# Patient Record
Sex: FEMALE | Race: ASIAN | NOT HISPANIC OR LATINO | URBAN - METROPOLITAN AREA
[De-identification: names, ages, dates, MRNs, and addresses within clinical notes are randomized per-mention and may not be internally consistent; named-entity substitution may affect disease eponyms.]

---

## 2020-09-25 ENCOUNTER — EMERGENCY (EMERGENCY)
Facility: HOSPITAL | Age: 50
LOS: 1 days | Discharge: HOME | End: 2020-09-25
Attending: EMERGENCY MEDICINE | Admitting: EMERGENCY MEDICINE
Payer: MEDICAID

## 2020-09-25 VITALS
OXYGEN SATURATION: 100 % | SYSTOLIC BLOOD PRESSURE: 124 MMHG | RESPIRATION RATE: 16 BRPM | DIASTOLIC BLOOD PRESSURE: 75 MMHG | TEMPERATURE: 98 F | HEART RATE: 104 BPM

## 2020-09-25 PROCEDURE — 99283 EMERGENCY DEPT VISIT LOW MDM: CPT

## 2020-09-25 RX ORDER — DIPHENHYDRAMINE HCL 50 MG
1 CAPSULE ORAL
Qty: 5 | Refills: 0
Start: 2020-09-25 | End: 2020-09-29

## 2020-09-25 RX ORDER — DIPHENHYDRAMINE HCL 50 MG
50 CAPSULE ORAL ONCE
Refills: 0 | Status: COMPLETED | OUTPATIENT
Start: 2020-09-25 | End: 2020-09-25

## 2020-09-25 RX ADMIN — Medication 50 MILLIGRAM(S): at 14:19

## 2020-09-25 RX ADMIN — Medication 60 MILLIGRAM(S): at 14:19

## 2020-09-25 NOTE — ED PROVIDER NOTE - CLINICAL SUMMARY MEDICAL DECISION MAKING FREE TEXT BOX
50 female htn dm hld c/o rash 1 week  -probable hives/allergic reaction - unclear trigger  -benadryl, prednisone  -outpt allergy follow up

## 2020-09-25 NOTE — ED PROVIDER NOTE - NSFOLLOWUPINSTRUCTIONS_ED_ALL_ED_FT
REST, NO STRENUOUS ACTIVITY  CONTINUE TAKING ANY CURRENT MEDICATIONS  TAKE NEW MEDICATIONS AS DIRECTED  **FOLLOW UP WITH ALLERGIST AS SOON AS POSSIBLE**  REFERRAL LIST GIVEN  RETURN TO ER FOR WORSENING SYMPTOMS

## 2020-09-25 NOTE — ED ADULT TRIAGE NOTE - CHIEF COMPLAINT QUOTE
Pt c[facility] rash  and [facility] back.  Denies new meds.  Pt seen at [facility] on Tuesday prescribed  zyrtec, and PCP prescribed atarax via phone.  Denies throat swelling, difficulty swallowing

## 2020-09-25 NOTE — ED PROVIDER NOTE - OBJECTIVE STATEMENT
50 htn dm hld presents to ER c/o rash x 1 week. Pt. is primarily Polish speaking -  ID [id number] - history obtained from translation service and daughter - states that for the past week has been experiencing diffuse pruritic rash - went to urgent care 5 days ago and given zyrtec with minimal releif - called her doctor who presceibed atarax 2 days ago - no relief as well. Denies tongue throat swelling or itching. States rash was on face as well but now has resolved and is most pronounced on trunk. Pt. milli new prodcuts food medications. Dneies fever chills nausea vomti cp sob or recent travel.

## 2020-09-25 NOTE — ED PROVIDER NOTE - PATIENT PORTAL LINK FT
You can access the FollowMyHealth Patient Portal offered by [facility] by registering at the following website: [email or web address]. By joining [employer]’s FollowMyHealth portal, you will also be able to view your health information using other applications (apps) compatible with our system.

## 2020-09-25 NOTE — ED PROVIDER NOTE - ATTENDING CONTRIBUTION TO CARE
DR. PAGE, ATTENDING MD-  I performed a face to face bedside interview with the patient regarding history of present illness, review of symptoms and past medical history. I completed an independent physical exam.  I have discussed the patient's plan of care with the PA.   Documentation as above in the note.    49 y/o female with hives.  States that they involve her left shoulder and back.  Pruritic.  No discharge.  Does not frequently get hives.  No known allergies.  No change to diet, no new beauty products, no new detergents.  Seen by pcp and urgent care, placed on two different antihistamines with minimal relief.  No signs of anaphylaxis, will tx with antihistamines, short course of steroids give allergist f/u.

## 2023-09-21 ENCOUNTER — EMERGENCY (EMERGENCY)
Facility: HOSPITAL | Age: 53
LOS: 1 days | Discharge: HOME | End: 2023-09-21
Attending: EMERGENCY MEDICINE | Admitting: EMERGENCY MEDICINE
Payer: MEDICAID

## 2023-09-21 VITALS
RESPIRATION RATE: 19 BRPM | SYSTOLIC BLOOD PRESSURE: 149 MMHG | OXYGEN SATURATION: 100 % | HEART RATE: 93 BPM | TEMPERATURE: 98 F | DIASTOLIC BLOOD PRESSURE: 80 MMHG

## 2023-09-21 VITALS
HEART RATE: 95 BPM | OXYGEN SATURATION: 100 % | DIASTOLIC BLOOD PRESSURE: 81 MMHG | RESPIRATION RATE: 16 BRPM | TEMPERATURE: 100 F | SYSTOLIC BLOOD PRESSURE: 144 MMHG

## 2023-09-21 LAB
ALBUMIN SERPL ELPH-MCNC: 4.3 G/DL — SIGNIFICANT CHANGE UP (ref 3.3–5)
ALP SERPL-CCNC: 81 U/L — SIGNIFICANT CHANGE UP (ref 40–120)
ALT FLD-CCNC: 50 U/L — HIGH (ref 4–33)
ANION GAP SERPL CALC-SCNC: 14 MMOL/L — SIGNIFICANT CHANGE UP (ref 7–14)
AST SERPL-CCNC: 29 U/L — SIGNIFICANT CHANGE UP (ref 4–32)
B-OH-BUTYR SERPL-SCNC: <0 MMOL/L — SIGNIFICANT CHANGE UP (ref 0–0.4)
BASE EXCESS BLDV CALC-SCNC: 0.3 MMOL/L — SIGNIFICANT CHANGE UP (ref -2–3)
BASOPHILS # BLD AUTO: 0.07 K/UL — SIGNIFICANT CHANGE UP (ref 0–0.2)
BASOPHILS NFR BLD AUTO: 0.6 % — SIGNIFICANT CHANGE UP (ref 0–2)
BILIRUB SERPL-MCNC: 0.6 MG/DL — SIGNIFICANT CHANGE UP (ref 0.2–1.2)
BLOOD GAS VENOUS COMPREHENSIVE RESULT: SIGNIFICANT CHANGE UP
BUN SERPL-MCNC: 22 MG/DL — SIGNIFICANT CHANGE UP (ref 7–23)
CALCIUM SERPL-MCNC: 9.5 MG/DL — SIGNIFICANT CHANGE UP (ref 8.4–10.5)
CHLORIDE BLDV-SCNC: 102 MMOL/L — SIGNIFICANT CHANGE UP (ref 96–108)
CHLORIDE SERPL-SCNC: 101 MMOL/L — SIGNIFICANT CHANGE UP (ref 98–107)
CO2 BLDV-SCNC: 26.7 MMOL/L — HIGH (ref 22–26)
CO2 SERPL-SCNC: 22 MMOL/L — SIGNIFICANT CHANGE UP (ref 22–31)
CREAT SERPL-MCNC: 1.15 MG/DL — SIGNIFICANT CHANGE UP (ref 0.5–1.3)
EGFR: 56 ML/MIN/1.73M2 — LOW
EOSINOPHIL # BLD AUTO: 0.79 K/UL — HIGH (ref 0–0.5)
EOSINOPHIL NFR BLD AUTO: 7.3 % — HIGH (ref 0–6)
GAS PNL BLDV: 136 MMOL/L — SIGNIFICANT CHANGE UP (ref 136–145)
GLUCOSE BLDV-MCNC: 375 MG/DL — HIGH (ref 70–99)
GLUCOSE SERPL-MCNC: 348 MG/DL — HIGH (ref 70–99)
HCO3 BLDV-SCNC: 25 MMOL/L — SIGNIFICANT CHANGE UP (ref 22–29)
HCT VFR BLD CALC: 34.1 % — LOW (ref 34.5–45)
HCT VFR BLDA CALC: 35 % — SIGNIFICANT CHANGE UP (ref 34.5–46.5)
HGB BLD CALC-MCNC: 11.7 G/DL — SIGNIFICANT CHANGE UP (ref 11.7–16.1)
HGB BLD-MCNC: 11.3 G/DL — LOW (ref 11.5–15.5)
IANC: 5.83 K/UL — SIGNIFICANT CHANGE UP (ref 1.8–7.4)
IMM GRANULOCYTES NFR BLD AUTO: 0.3 % — SIGNIFICANT CHANGE UP (ref 0–0.9)
LACTATE BLDV-MCNC: 3 MMOL/L — HIGH (ref 0.5–2)
LYMPHOCYTES # BLD AUTO: 29.7 % — SIGNIFICANT CHANGE UP (ref 13–44)
LYMPHOCYTES # BLD AUTO: 3.21 K/UL — SIGNIFICANT CHANGE UP (ref 1–3.3)
MCHC RBC-ENTMCNC: 29 PG — SIGNIFICANT CHANGE UP (ref 27–34)
MCHC RBC-ENTMCNC: 33.1 GM/DL — SIGNIFICANT CHANGE UP (ref 32–36)
MCV RBC AUTO: 87.4 FL — SIGNIFICANT CHANGE UP (ref 80–100)
MONOCYTES # BLD AUTO: 0.87 K/UL — SIGNIFICANT CHANGE UP (ref 0–0.9)
MONOCYTES NFR BLD AUTO: 8.1 % — SIGNIFICANT CHANGE UP (ref 2–14)
NEUTROPHILS # BLD AUTO: 5.83 K/UL — SIGNIFICANT CHANGE UP (ref 1.8–7.4)
NEUTROPHILS NFR BLD AUTO: 54 % — SIGNIFICANT CHANGE UP (ref 43–77)
NRBC # BLD: 0 /100 WBCS — SIGNIFICANT CHANGE UP (ref 0–0)
NRBC # FLD: 0 K/UL — SIGNIFICANT CHANGE UP (ref 0–0)
PCO2 BLDV: 42 MMHG — SIGNIFICANT CHANGE UP (ref 39–52)
PH BLDV: 7.39 — SIGNIFICANT CHANGE UP (ref 7.32–7.43)
PLATELET # BLD AUTO: 280 K/UL — SIGNIFICANT CHANGE UP (ref 150–400)
PO2 BLDV: 50 MMHG — HIGH (ref 25–45)
POTASSIUM BLDV-SCNC: 3.8 MMOL/L — SIGNIFICANT CHANGE UP (ref 3.5–5.1)
POTASSIUM SERPL-MCNC: 3.8 MMOL/L — SIGNIFICANT CHANGE UP (ref 3.5–5.3)
POTASSIUM SERPL-SCNC: 3.8 MMOL/L — SIGNIFICANT CHANGE UP (ref 3.5–5.3)
PROT SERPL-MCNC: 7.5 G/DL — SIGNIFICANT CHANGE UP (ref 6–8.3)
RBC # BLD: 3.9 M/UL — SIGNIFICANT CHANGE UP (ref 3.8–5.2)
RBC # FLD: 12.6 % — SIGNIFICANT CHANGE UP (ref 10.3–14.5)
SAO2 % BLDV: 77.7 % — SIGNIFICANT CHANGE UP (ref 67–88)
SODIUM SERPL-SCNC: 137 MMOL/L — SIGNIFICANT CHANGE UP (ref 135–145)
WBC # BLD: 10.8 K/UL — HIGH (ref 3.8–10.5)
WBC # FLD AUTO: 10.8 K/UL — HIGH (ref 3.8–10.5)

## 2023-09-21 PROCEDURE — 71045 X-RAY EXAM CHEST 1 VIEW: CPT | Mod: 26

## 2023-09-21 PROCEDURE — 99285 EMERGENCY DEPT VISIT HI MDM: CPT

## 2023-09-21 PROCEDURE — 73610 X-RAY EXAM OF ANKLE: CPT | Mod: 26,RT

## 2023-09-21 PROCEDURE — 73600 X-RAY EXAM OF ANKLE: CPT | Mod: 26,52,59,RT

## 2023-09-21 PROCEDURE — 73630 X-RAY EXAM OF FOOT: CPT | Mod: 26,RT

## 2023-09-21 PROCEDURE — 73590 X-RAY EXAM OF LOWER LEG: CPT | Mod: 26,RT

## 2023-09-21 PROCEDURE — 72170 X-RAY EXAM OF PELVIS: CPT | Mod: 26

## 2023-09-21 RX ORDER — KETOROLAC TROMETHAMINE 30 MG/ML
15 SYRINGE (ML) INJECTION ONCE
Refills: 0 | Status: DISCONTINUED | OUTPATIENT
Start: 2023-09-21 | End: 2023-09-21

## 2023-09-21 RX ORDER — ACETAMINOPHEN 500 MG
650 TABLET ORAL ONCE
Refills: 0 | Status: COMPLETED | OUTPATIENT
Start: 2023-09-21 | End: 2023-09-21

## 2023-09-21 RX ORDER — MORPHINE SULFATE 50 MG/1
4 CAPSULE, EXTENDED RELEASE ORAL ONCE
Refills: 0 | Status: DISCONTINUED | OUTPATIENT
Start: 2023-09-21 | End: 2023-09-21

## 2023-09-21 RX ORDER — OXYCODONE HYDROCHLORIDE 5 MG/1
1 TABLET ORAL
Qty: 9 | Refills: 0
Start: 2023-09-21 | End: 2023-09-23

## 2023-09-21 RX ORDER — SODIUM CHLORIDE 9 MG/ML
1000 INJECTION INTRAMUSCULAR; INTRAVENOUS; SUBCUTANEOUS ONCE
Refills: 0 | Status: COMPLETED | OUTPATIENT
Start: 2023-09-21 | End: 2023-09-21

## 2023-09-21 RX ADMIN — SODIUM CHLORIDE 1000 MILLILITER(S): 9 INJECTION INTRAMUSCULAR; INTRAVENOUS; SUBCUTANEOUS at 20:02

## 2023-09-21 RX ADMIN — Medication 650 MILLIGRAM(S): at 20:02

## 2023-09-21 RX ADMIN — Medication 15 MILLIGRAM(S): at 20:11

## 2023-09-21 NOTE — CONSULT NOTE ADULT - SUBJECTIVE AND OBJECTIVE BOX
Patient is a 53y old  Female who presents with a chief complaint of     HPI:  The patient is a 53y Female complaining of fall.    PAST MEDICAL & SURGICAL HISTORY:  DM (diabetes mellitus)      HTN (hypertension)      HLD (hyperlipidemia)          MEDICATIONS  (STANDING):    MEDICATIONS  (PRN):      Allergies    No Known Allergies    Intolerances        VITALS:    Vital Signs Last 24 Hrs  T(C): 36.7 (21 Sep 2023 18:00), Max: 37.5 (21 Sep 2023 16:54)  T(F): 98 (21 Sep 2023 18:00), Max: 99.5 (21 Sep 2023 16:54)  HR: 94 (21 Sep 2023 18:00) (94 - 95)  BP: 146/58 (21 Sep 2023 18:00) (144/81 - 146/58)  BP(mean): --  RR: 16 (21 Sep 2023 18:00) (16 - 16)  SpO2: 100% (21 Sep 2023 18:00) (100% - 100%)    Parameters below as of 21 Sep 2023 18:00  Patient On (Oxygen Delivery Method): room air        LABS:                          11.3   10.80 )-----------( 280      ( 21 Sep 2023 19:25 )             34.1       09-21    137  |  101  |  22  ----------------------------<  348<H>  3.8   |  22  |  1.15    Ca    9.5      21 Sep 2023 19:25    TPro  7.5  /  Alb  4.3  /  TBili  0.6  /  DBili  x   /  AST  29  /  ALT  50<H>  /  AlkPhos  81  09-21      CAPILLARY BLOOD GLUCOSE      POCT Blood Glucose.: 417 mg/dL (21 Sep 2023 16:58)          LOWER EXTREMITY PHYSICAL EXAM:    Vascular: DP/PT 2/4, B/L, CFT <3seconds B/L, Temperature gradient warm to cool, B/L.   Neuro: Epicritic sensation intact to the level of digits, B/L.  Musculoskeletal/Ortho: unremarkable  Skin: Right foot no open wound or lesions, no acute signs of infection, mild posterior medial and lateral plantar ecchymosis, global edema, no skin tenting. Left foot no open wounds or lesions, no acute signs of infection, no skin tenting.      RADIOLOGY & ADDITIONAL STUDIES:    < from: Xray Foot AP + Lateral + Oblique, Right (09.21.23 @ 20:22) >  ******PRELIMINARY REPORT******      ******PRELIMINARY REPORT******         ACC: [id number] EXAM:  XR FOOT COMP MIN 3 VIEWS RT   ORDERED BY: THERESE RICHARDS     PROCEDURE DATE:  09/21/2023    ******PRELIMINARY REPORT******      ******PRELIMINARY REPORT******           INTERPRETATION:  Acute oblique fracture through the distal fibula   metaphysis with trace posterolateral displacement of the distal fracture   fragment.        ******PRELIMINARY REPORT******      ******PRELIMINARY REPORT******       < end of copied text >   Patient is a 53y old  Female who presents with a chief complaint of     HPI:  The patient is a 53y Female complaining of fall.    PAST MEDICAL & SURGICAL HISTORY:  DM (diabetes mellitus)      HTN (hypertension)      HLD (hyperlipidemia)          MEDICATIONS  (STANDING):    MEDICATIONS  (PRN):      Allergies    No Known Allergies    Intolerances        VITALS:    Vital Signs Last 24 Hrs  T(C): 36.7 (21 Sep 2023 18:00), Max: 37.5 (21 Sep 2023 16:54)  T(F): 98 (21 Sep 2023 18:00), Max: 99.5 (21 Sep 2023 16:54)  HR: 94 (21 Sep 2023 18:00) (94 - 95)  BP: 146/58 (21 Sep 2023 18:00) (144/81 - 146/58)  BP(mean): --  RR: 16 (21 Sep 2023 18:00) (16 - 16)  SpO2: 100% (21 Sep 2023 18:00) (100% - 100%)    Parameters below as of 21 Sep 2023 18:00  Patient On (Oxygen Delivery Method): room air        LABS:                          11.3   10.80 )-----------( 280      ( 21 Sep 2023 19:25 )             34.1       09-21    137  |  101  |  22  ----------------------------<  348<H>  3.8   |  22  |  1.15    Ca    9.5      21 Sep 2023 19:25    TPro  7.5  /  Alb  4.3  /  TBili  0.6  /  DBili  x   /  AST  29  /  ALT  50<H>  /  AlkPhos  81  09-21      CAPILLARY BLOOD GLUCOSE      POCT Blood Glucose.: 417 mg/dL (21 Sep 2023 16:58)          LOWER EXTREMITY PHYSICAL EXAM:    Vascular: DP/PT 2/4, B/L, CFT <3 seconds B/L, Temperature gradient warm to cool, B/L.   Neuro: Epicritic sensation intact to the level of digits, B/L.  Musculoskeletal/Ortho: unremarkable  Skin: Right foot no open wound or lesions, no acute signs of infection, mild posterior medial and lateral plantar ecchymosis, global edema, no skin tenting. Left foot no open wounds or lesions, no acute signs of infection, no skin tenting.      RADIOLOGY & ADDITIONAL STUDIES:    < from: Xray Foot AP + Lateral + Oblique, Right (09.21.23 @ 20:22) >  ******PRELIMINARY REPORT******      ******PRELIMINARY REPORT******         ACC: [id number] EXAM:  XR FOOT COMP MIN 3 VIEWS RT   ORDERED BY: THERESE RICHARDS     PROCEDURE DATE:  09/21/2023    ******PRELIMINARY REPORT******      ******PRELIMINARY REPORT******           INTERPRETATION:  Acute oblique fracture through the distal fibula   metaphysis with trace posterolateral displacement of the distal fracture   fragment.        ******PRELIMINARY REPORT******      ******PRELIMINARY REPORT******       < end of copied text >

## 2023-09-21 NOTE — ED ADULT TRIAGE NOTE - CHIEF COMPLAINT QUOTE
Pt arrives s/p slip and fall last night at 2300pm on a slippery surface. Endorsing right ankle pain and swelling. Denies hitting head, LOC, blood thinners. Hx of HTN, DM, HLD. Fingerstick 417.

## 2023-09-21 NOTE — ED PROVIDER NOTE - NSFOLLOWUPINSTRUCTIONS_ED_ALL_ED_FT
You came to the Emergency Room because of R ankle pain after fall.     Your X-ray findings were concerning for fibula fracture.     You were evaluated by our Podiatry team in the Emergency Room and your R leg was splinted.     For mild-moderate pain,  You may take Tylenol 1000 mg every 8 hours (no more than 4000 mg per 24 hours) as needed for pain.   You may take Ibuprofen 400 mg every 6 hours as needed for pain.     For severe pain,  Please take the prescribed medication Oxycodone 5 mg every 8 hours as needed.     Please follow up with the Podiatry Clinic on 09/25/2023 to monitor your symptoms and further evaluation.    A representative from [facility] will call you to schedule for an appointment within the next few days.     Please come back to the Emergency Room if your symptoms get worse. You came to the Emergency Room because of R ankle pain after fall.     Your X-ray findings were concerning for fibula fracture.     You were evaluated by our Podiatry team in the Emergency Room and your R leg was splinted. Please do not bear weight on your R ankle. Please use crutches to ambulate.     For mild-moderate pain,  You may take Tylenol 1000 mg every 8 hours (no more than 4000 mg per 24 hours) as needed for pain.   You may take Ibuprofen 400 mg every 6 hours as needed for pain.     For severe pain,  Please take the prescribed medication Oxycodone 5 mg every 8 hours as needed.     Please follow up with the Podiatry Clinic on 09/25/2023 to monitor your symptoms and further evaluation.    A representative from [facility] will call you to schedule for an appointment within the next few days.     Please come back to the Emergency Room if your symptoms get worse.

## 2023-09-21 NOTE — ED PROVIDER NOTE - PRO INTERPRETER NEED 2
Pt. Transferred to [facility] for ultrasound via wheelchair [facility]

## 2023-09-21 NOTE — ED PROVIDER NOTE - CLINICAL SUMMARY MEDICAL DECISION MAKING FREE TEXT BOX
Rosalio CASTRO:   53-year-old female here for right ankle pain after a mechanical fall yesterday.   Exam is significant for right ankle swelling and limited range of motion.  She has tenderness to the lateral malleolus.  Concern for acute fracture.  Plan for labs, x-ray imaging, pain control.  Morphine declined.      Patient was found to have acute fibular fracture.  Podiatry consulted.  Splint was placed.  Recommending CT imaging and outpatient follow-up.

## 2023-09-21 NOTE — ED PROVIDER NOTE - PROGRESS NOTE DETAILS
Sherrill PGY3: per podiatry, patient is s/p posterior splint, patient will get CT RLE, discharge with podiatry follow up on 09/25/2023. Podiatry clinic will call the patient tomorrow to schedule for an appointment. Sherrill PGY3: patient does not want to wait for CT to be performed. Patient will be discharged. Patient will be discharged with crutches. Rosalio CASTRO: Podiatry informed that patient does not want to wait for CT Scan.

## 2023-09-21 NOTE — ED ADULT NURSE NOTE - OBJECTIVE STATEMENT
pt ambulatory with assistance to ED spot 1A for right ankle pain/ difficulty ambulating s/p slip and fall last night. pt denies head trama, dizziness. #20 g piv placed in right ac, labs sent. see emar for treatment. comfort measures applied

## 2023-09-21 NOTE — ED PROVIDER NOTE - OBJECTIVE STATEMENT
Rosalio CASTRO: Patient is a 53-year-old female,  Maltese speaking, with a history of diabetes mellitus, hypertension, hyperlipidemia brought in by son for evaluation of right ankle pain and unable to ambulate since yesterday.  Patient was offered a  but declined and preferred her son to translate.  Son, Twila Brennan is translating at bedside. Patient was walking at home and slipped on a slick floor.  Patient did a split and injured her right ankle.  She has been unable to walk since this happened.  Patient denies any head trauma.  She denies any other injuries.  She denies any preceding symptoms such as chest pain, shortness of breath.  She denies any recent illnesses such as fevers, chills, cough.  She took Tylenol without relief.

## 2023-09-21 NOTE — ED PROVIDER NOTE - PATIENT PORTAL LINK FT
You can access the FollowMyHealth Patient Portal offered by [facility] by registering at the following website: [email or web address]. By joining [employer]’s FollowMyHealth portal, you will also be able to view your health information using other applications (apps) compatible with our system.

## 2023-09-21 NOTE — ED ADULT NURSE NOTE - NSFALLRISKINTERV_ED_ALL_ED
Assistance OOB with selected safe patient handling equipment if applicable/Assistance with ambulation/Communicate fall risk and risk factors to all staff, patient, and family/Monitor gait and stability/Provide visual cue: yellow wristband, yellow gown, etc/Reinforce activity limits and safety measures with patient and family/Call bell, personal items and telephone in reach/Instruct patient to call for assistance before getting out of bed/chair/stretcher/Non-slip footwear applied when patient is off stretcher/[place] to call system/Physically safe environment - no spills, clutter or unnecessary equipment/Purposeful Proactive Rounding/Room/bathroom lighting operational, light cord in reach

## 2023-09-21 NOTE — CONSULT NOTE ADULT - ASSESSMENT
53F presents with right acute oblique fracture through the distal fibula metaphysis  - Patient seen and evaluated  - Afebrile, WBC 10.80  - Right foot no open wound or lesions, no acute signs of infection, mild posterior medial and lateral plantar ecchymosis, global edema, no skin tenting. Left foot no open wounds or lesions, no acute signs of infection, no skin tenting.  - Right ankle X-ray: Acute oblique fracture through the distal fibula metaphysis with trace posterolateral displacement of the distal fracture fragment.   53F presents with right acute oblique fracture through the distal fibula metaphysis  - Patient seen and evaluated  - Afebrile, WBC 10.80  - Right foot no open wound or lesions, no acute signs of infection, mild posterior medial and lateral plantar ecchymosis, global edema, no skin tenting. Left foot no open wounds or lesions, no acute signs of infection, no skin tenting.  - Right ankle X-ray: Acute oblique fracture through the distal fibula metaphysis with trace posterolateral displacement of the distal fracture fragment.  - Right ankle stress view: increased medial clear space, decrease in tib-fib overlap (resident read)  - Applied lucero compression with posterior splint  - Advised patient to remain non-weight bearing on right lower extremity with use of crutches. Pt verbally confirmed she would be able to use crutches  - Advised patient to keep dressing clean, dry and intact until follow up visit  - Advised patient to elevate right lower extremity to help control swelling  - Recommend CT prior to discharge for surgical planning  - Patient is stable for discharge and should Dr. Lombardi [phone] tomorrow to schedule an appointment for Monday.  - Discussed with attending

## 2023-09-22 PROBLEM — E78.5 HYPERLIPIDEMIA, UNSPECIFIED: Chronic | Status: ACTIVE | Noted: 2020-09-25

## 2023-09-22 PROBLEM — E11.9 TYPE 2 DIABETES MELLITUS WITHOUT COMPLICATIONS: Chronic | Status: ACTIVE | Noted: 2020-09-25

## 2023-09-22 PROBLEM — I10 ESSENTIAL (PRIMARY) HYPERTENSION: Chronic | Status: ACTIVE | Noted: 2020-09-25

## 2023-09-22 RX ORDER — KETOROLAC TROMETHAMINE 30 MG/ML
15 SYRINGE (ML) INJECTION ONCE
Refills: 0 | Status: DISCONTINUED | OUTPATIENT
Start: 2023-09-22 | End: 2023-09-22

## 2023-09-22 RX ADMIN — Medication 15 MILLIGRAM(S): at 00:13

## 2023-09-27 PROBLEM — Z00.00 ENCOUNTER FOR PREVENTIVE HEALTH EXAMINATION: Status: ACTIVE | Noted: 2023-09-27

## 2023-10-04 ENCOUNTER — NON-APPOINTMENT (OUTPATIENT)
Age: 53
End: 2023-10-04

## 2023-10-04 ENCOUNTER — APPOINTMENT (OUTPATIENT)
Dept: ORTHOPEDIC SURGERY | Facility: CLINIC | Age: 53
End: 2023-10-04
Payer: MEDICAID

## 2023-10-04 PROCEDURE — 99203 OFFICE O/P NEW LOW 30 MIN: CPT

## 2023-10-04 PROCEDURE — 73610 X-RAY EXAM OF ANKLE: CPT | Mod: RT

## 2023-10-04 RX ORDER — MELOXICAM 7.5 MG/1
7.5 TABLET ORAL
Qty: 30 | Refills: 0 | Status: ACTIVE | COMMUNITY
Start: 2023-10-04 | End: 1900-01-01

## 2023-10-11 ENCOUNTER — RESULT REVIEW (OUTPATIENT)
Age: 53
End: 2023-10-11

## 2023-10-11 ENCOUNTER — OUTPATIENT (OUTPATIENT)
Dept: OUTPATIENT SERVICES | Facility: HOSPITAL | Age: 53
LOS: 1 days | End: 2023-10-11
Payer: MEDICAID

## 2023-10-11 ENCOUNTER — APPOINTMENT (OUTPATIENT)
Dept: ORTHOPEDIC SURGERY | Facility: HOSPITAL | Age: 53
End: 2023-10-11

## 2023-10-11 ENCOUNTER — APPOINTMENT (OUTPATIENT)
Dept: RADIOLOGY | Facility: HOSPITAL | Age: 53
End: 2023-10-11

## 2023-10-11 VITALS
TEMPERATURE: 97 F | HEART RATE: 98 BPM | BODY MASS INDEX: 26.4 KG/M2 | DIASTOLIC BLOOD PRESSURE: 74 MMHG | WEIGHT: 149 LBS | SYSTOLIC BLOOD PRESSURE: 145 MMHG | HEIGHT: 63 IN

## 2023-10-11 LAB
A1C WITH ESTIMATED AVERAGE GLUCOSE RESULT: 8.3 % — HIGH (ref 4–5.6)
ANION GAP SERPL CALC-SCNC: 16 MMOL/L — HIGH (ref 7–14)
APTT BLD: 35.9 SEC — HIGH (ref 24.5–35.6)
BASOPHILS # BLD AUTO: 0.11 K/UL — SIGNIFICANT CHANGE UP (ref 0–0.2)
BASOPHILS NFR BLD AUTO: 1 % — SIGNIFICANT CHANGE UP (ref 0–2)
BUN SERPL-MCNC: 24 MG/DL — HIGH (ref 7–23)
CALCIUM SERPL-MCNC: 9.5 MG/DL — SIGNIFICANT CHANGE UP (ref 8.4–10.5)
CHLORIDE SERPL-SCNC: 103 MMOL/L — SIGNIFICANT CHANGE UP (ref 98–107)
CO2 SERPL-SCNC: 18 MMOL/L — LOW (ref 22–31)
CREAT SERPL-MCNC: 1.15 MG/DL — SIGNIFICANT CHANGE UP (ref 0.5–1.3)
EGFR: 56 ML/MIN/1.73M2 — LOW
EOSINOPHIL # BLD AUTO: 0.98 K/UL — HIGH (ref 0–0.5)
EOSINOPHIL NFR BLD AUTO: 9.1 % — HIGH (ref 0–6)
ESTIMATED AVERAGE GLUCOSE: 192 — SIGNIFICANT CHANGE UP
GLUCOSE SERPL-MCNC: 173 MG/DL — HIGH (ref 70–99)
HCT VFR BLD CALC: 35.4 % — SIGNIFICANT CHANGE UP (ref 34.5–45)
HGB BLD-MCNC: 11.7 G/DL — SIGNIFICANT CHANGE UP (ref 11.5–15.5)
IANC: 5.97 K/UL — SIGNIFICANT CHANGE UP (ref 1.8–7.4)
IMM GRANULOCYTES NFR BLD AUTO: 0.8 % — SIGNIFICANT CHANGE UP (ref 0–0.9)
LYMPHOCYTES # BLD AUTO: 28.5 % — SIGNIFICANT CHANGE UP (ref 13–44)
LYMPHOCYTES # BLD AUTO: 3.07 K/UL — SIGNIFICANT CHANGE UP (ref 1–3.3)
MCHC RBC-ENTMCNC: 28.8 PG — SIGNIFICANT CHANGE UP (ref 27–34)
MCHC RBC-ENTMCNC: 33.1 GM/DL — SIGNIFICANT CHANGE UP (ref 32–36)
MCV RBC AUTO: 87.2 FL — SIGNIFICANT CHANGE UP (ref 80–100)
MONOCYTES # BLD AUTO: 0.56 K/UL — SIGNIFICANT CHANGE UP (ref 0–0.9)
MONOCYTES NFR BLD AUTO: 5.2 % — SIGNIFICANT CHANGE UP (ref 2–14)
NEUTROPHILS # BLD AUTO: 5.97 K/UL — SIGNIFICANT CHANGE UP (ref 1.8–7.4)
NEUTROPHILS NFR BLD AUTO: 55.4 % — SIGNIFICANT CHANGE UP (ref 43–77)
NRBC # BLD: 0 /100 WBCS — SIGNIFICANT CHANGE UP (ref 0–0)
NRBC # FLD: 0 K/UL — SIGNIFICANT CHANGE UP (ref 0–0)
PLATELET # BLD AUTO: 384 K/UL — SIGNIFICANT CHANGE UP (ref 150–400)
POTASSIUM SERPL-MCNC: 4.8 MMOL/L — SIGNIFICANT CHANGE UP (ref 3.5–5.3)
POTASSIUM SERPL-SCNC: 4.8 MMOL/L — SIGNIFICANT CHANGE UP (ref 3.5–5.3)
RBC # BLD: 4.06 M/UL — SIGNIFICANT CHANGE UP (ref 3.8–5.2)
RBC # FLD: 12.7 % — SIGNIFICANT CHANGE UP (ref 10.3–14.5)
SODIUM SERPL-SCNC: 137 MMOL/L — SIGNIFICANT CHANGE UP (ref 135–145)
WBC # BLD: 10.78 K/UL — HIGH (ref 3.8–10.5)
WBC # FLD AUTO: 10.78 K/UL — HIGH (ref 3.8–10.5)

## 2023-10-11 PROCEDURE — 73610 X-RAY EXAM OF ANKLE: CPT | Mod: 26,RT

## 2023-10-12 DIAGNOSIS — I99.8 OTHER DISORDER OF CIRCULATORY SYSTEM: ICD-10-CM

## 2023-10-12 DIAGNOSIS — Z86.39 PERSONAL HISTORY OF OTHER ENDOCRINE, NUTRITIONAL AND METABOLIC DISEASE: ICD-10-CM

## 2023-10-12 DIAGNOSIS — S82.891D OTHER FRACTURE OF RIGHT LOWER LEG, SUBSEQUENT ENCOUNTER FOR CLOSED FRACTURE WITH ROUTINE HEALING: ICD-10-CM

## 2023-10-18 ENCOUNTER — APPOINTMENT (OUTPATIENT)
Dept: ORTHOPEDIC SURGERY | Facility: HOSPITAL | Age: 53
End: 2023-10-18

## 2023-10-25 ENCOUNTER — RESULT REVIEW (OUTPATIENT)
Age: 53
End: 2023-10-25

## 2023-10-25 ENCOUNTER — APPOINTMENT (OUTPATIENT)
Dept: RADIOLOGY | Facility: HOSPITAL | Age: 53
End: 2023-10-25

## 2023-10-25 ENCOUNTER — APPOINTMENT (OUTPATIENT)
Dept: ORTHOPEDIC SURGERY | Facility: HOSPITAL | Age: 53
End: 2023-10-25

## 2023-10-25 ENCOUNTER — OUTPATIENT (OUTPATIENT)
Dept: OUTPATIENT SERVICES | Facility: HOSPITAL | Age: 53
LOS: 1 days | End: 2023-10-25
Payer: MEDICAID

## 2023-10-25 VITALS
HEART RATE: 96 BPM | DIASTOLIC BLOOD PRESSURE: 67 MMHG | WEIGHT: 149 LBS | HEIGHT: 63 IN | BODY MASS INDEX: 26.4 KG/M2 | TEMPERATURE: 97.7 F | SYSTOLIC BLOOD PRESSURE: 116 MMHG

## 2023-10-25 PROCEDURE — 73610 X-RAY EXAM OF ANKLE: CPT | Mod: 26,RT

## 2023-10-25 RX ORDER — MELOXICAM 7.5 MG/1
7.5 TABLET ORAL DAILY
Qty: 30 | Refills: 0 | Status: ACTIVE | COMMUNITY
Start: 2023-10-25 | End: 1900-01-01

## 2023-11-01 DIAGNOSIS — S82.891D OTHER FRACTURE OF RIGHT LOWER LEG, SUBSEQUENT ENCOUNTER FOR CLOSED FRACTURE WITH ROUTINE HEALING: ICD-10-CM

## 2023-11-01 DIAGNOSIS — Z86.39 PERSONAL HISTORY OF OTHER ENDOCRINE, NUTRITIONAL AND METABOLIC DISEASE: ICD-10-CM

## 2023-11-15 ENCOUNTER — RESULT REVIEW (OUTPATIENT)
Age: 53
End: 2023-11-15

## 2023-11-15 ENCOUNTER — APPOINTMENT (OUTPATIENT)
Dept: RADIOLOGY | Facility: HOSPITAL | Age: 53
End: 2023-11-15

## 2023-11-15 ENCOUNTER — APPOINTMENT (OUTPATIENT)
Dept: ORTHOPEDIC SURGERY | Facility: HOSPITAL | Age: 53
End: 2023-11-15

## 2023-11-15 ENCOUNTER — OUTPATIENT (OUTPATIENT)
Dept: OUTPATIENT SERVICES | Facility: HOSPITAL | Age: 53
LOS: 1 days | End: 2023-11-15
Payer: MEDICAID

## 2023-11-15 VITALS
SYSTOLIC BLOOD PRESSURE: 130 MMHG | HEART RATE: 95 BPM | HEIGHT: 63 IN | DIASTOLIC BLOOD PRESSURE: 74 MMHG | BODY MASS INDEX: 26.4 KG/M2 | TEMPERATURE: 96.4 F | WEIGHT: 149 LBS

## 2023-11-15 DIAGNOSIS — I99.8 OTHER DISORDER OF CIRCULATORY SYSTEM: ICD-10-CM

## 2023-11-15 DIAGNOSIS — Z86.39 PERSONAL HISTORY OF OTHER ENDOCRINE, NUTRITIONAL AND METABOLIC DISEASE: ICD-10-CM

## 2023-11-15 DIAGNOSIS — S82.891D OTHER FRACTURE OF RIGHT LOWER LEG, SUBSEQUENT ENCOUNTER FOR CLOSED FRACTURE WITH ROUTINE HEALING: ICD-10-CM

## 2023-11-15 PROCEDURE — 73610 X-RAY EXAM OF ANKLE: CPT | Mod: 26,RT

## 2023-11-16 PROBLEM — S82.891D CLOSED FRACTURE OF RIGHT ANKLE WITH ROUTINE HEALING, SUBSEQUENT ENCOUNTER: Status: ACTIVE | Noted: 2023-10-04

## 2023-11-16 PROBLEM — Z86.39 HISTORY OF DIABETES MELLITUS: Status: ACTIVE | Noted: 2023-10-05

## 2023-11-16 PROBLEM — I99.8 VASCULAR CALCIFICATION: Status: ACTIVE | Noted: 2023-10-05

## 2023-11-20 DIAGNOSIS — Z86.39 PERSONAL HISTORY OF OTHER ENDOCRINE, NUTRITIONAL AND METABOLIC DISEASE: ICD-10-CM

## 2023-11-20 DIAGNOSIS — I99.8 OTHER DISORDER OF CIRCULATORY SYSTEM: ICD-10-CM

## 2023-11-20 DIAGNOSIS — S82.891D OTHER FRACTURE OF RIGHT LOWER LEG, SUBSEQUENT ENCOUNTER FOR CLOSED FRACTURE WITH ROUTINE HEALING: ICD-10-CM

## 2023-12-20 ENCOUNTER — APPOINTMENT (OUTPATIENT)
Dept: ORTHOPEDIC SURGERY | Facility: HOSPITAL | Age: 53
End: 2023-12-20